# Patient Record
Sex: MALE | Race: WHITE | Employment: FULL TIME | URBAN - METROPOLITAN AREA
[De-identification: names, ages, dates, MRNs, and addresses within clinical notes are randomized per-mention and may not be internally consistent; named-entity substitution may affect disease eponyms.]

---

## 2023-01-09 ENCOUNTER — HOSPITAL ENCOUNTER (OUTPATIENT)
Dept: PREADMISSION TESTING | Age: 58
Discharge: HOME | End: 2023-01-09

## 2023-01-09 VITALS — BODY MASS INDEX: 34.65 KG/M2 | WEIGHT: 242 LBS | HEIGHT: 70 IN

## 2023-01-09 RX ORDER — SODIUM CHLORIDE, SODIUM LACTATE, POTASSIUM CHLORIDE, CALCIUM CHLORIDE 600; 310; 30; 20 MG/100ML; MG/100ML; MG/100ML; MG/100ML
125 INJECTION, SOLUTION INTRAVENOUS CONTINUOUS
Status: CANCELLED | OUTPATIENT
Start: 2023-01-09

## 2023-01-09 RX ORDER — CETIRIZINE HYDROCHLORIDE 10 MG/1
10 TABLET ORAL DAILY
COMMUNITY

## 2023-01-09 RX ORDER — ALLOPURINOL 300 MG/1
300 TABLET ORAL DAILY
COMMUNITY

## 2023-01-09 RX ORDER — FLUTICASONE PROPIONATE 50 MCG
2 SPRAY, SUSPENSION (ML) NASAL DAILY
COMMUNITY

## 2023-01-09 NOTE — PERIOP NOTES
PAT - SURGICAL PRE-ADMISSION INSTRUCTIONS    NAME:  José Mcgowan                                                          TODAY'S DATE:  1/9/2023    SURGERY DATE:  1/20/2023                                  SURGERY ARRIVAL TIME:   tba    Do NOT eat or drink anything, including candy or gum, after MIDNIGHT on 1/19/23 , unless you have specific instructions from your Surgeon or Anesthesia Provider to do so. No smoking 24 hours before surgery. No alcohol 24 hours prior to the day of surgery. No recreational drugs for one week prior to the day of surgery. Leave all valuables, including money/purse, at home. Remove all jewelry, nail polish, makeup (including mascara); no lotions, powders, deodorant, or perfume/cologne/after shave. Glasses/Contact lenses and Dentures may be worn to the hospital.  They will be removed prior to surgery. Call your doctor if symptoms of a cold or illness develop within 24 ours prior to surgery. AN ADULT MUST DRIVE YOU HOME AFTER OUTPATIENT SURGERY. If you are having an OUTPATIENT procedure, please make arrangements for a responsible adult to be with you for 24 hours after your surgery. If you are admitted to the hospital, you will be assigned to a bed after surgery is complete. Normally a family member will not be able to see you until you are in your assigned bed. 12. Visitation Restrictions Explained. Special Instructions: Take these medications the morning of surgery with a sip of water:  Protonix    Patient Prep:    shower with anti-bacterial soap     These surgical instructions were reviewed with patient during the PAT phone call.

## 2023-01-19 ENCOUNTER — ANESTHESIA EVENT (OUTPATIENT)
Dept: SURGERY | Age: 58
End: 2023-01-19
Payer: COMMERCIAL

## 2023-01-20 ENCOUNTER — HOSPITAL ENCOUNTER (OUTPATIENT)
Age: 58
Setting detail: OUTPATIENT SURGERY
Discharge: HOME | End: 2023-01-20
Attending: SURGERY | Admitting: SURGERY
Payer: COMMERCIAL

## 2023-01-20 ENCOUNTER — ANESTHESIA (OUTPATIENT)
Dept: SURGERY | Age: 58
End: 2023-01-20
Payer: COMMERCIAL

## 2023-01-20 VITALS
SYSTOLIC BLOOD PRESSURE: 144 MMHG | DIASTOLIC BLOOD PRESSURE: 76 MMHG | OXYGEN SATURATION: 96 % | WEIGHT: 238.7 LBS | HEART RATE: 88 BPM | BODY MASS INDEX: 34.17 KG/M2 | TEMPERATURE: 97 F | RESPIRATION RATE: 18 BRPM | HEIGHT: 70 IN

## 2023-01-20 DIAGNOSIS — Z87.19 S/P LAPAROSCOPIC HERNIA REPAIR: Primary | ICD-10-CM

## 2023-01-20 DIAGNOSIS — Z98.890 S/P LAPAROSCOPIC HERNIA REPAIR: Primary | ICD-10-CM

## 2023-01-20 PROCEDURE — 77030040361 HC SLV COMPR DVT MDII -B: Performed by: SURGERY

## 2023-01-20 PROCEDURE — 77030008516 HC TBNG INSUF ENDO S&N -B: Performed by: SURGERY

## 2023-01-20 PROCEDURE — 77030035277 HC OBTRTR BLDELSS DISP INTU -B: Performed by: SURGERY

## 2023-01-20 PROCEDURE — 77030020703 HC SEAL CANN DISP INTU -B: Performed by: SURGERY

## 2023-01-20 PROCEDURE — 76060000034 HC ANESTHESIA 1.5 TO 2 HR: Performed by: SURGERY

## 2023-01-20 PROCEDURE — 74011250636 HC RX REV CODE- 250/636: Performed by: SURGERY

## 2023-01-20 PROCEDURE — 77030010507 HC ADH SKN DERMBND J&J -B: Performed by: SURGERY

## 2023-01-20 PROCEDURE — 74011250636 HC RX REV CODE- 250/636: Performed by: NURSE ANESTHETIST, CERTIFIED REGISTERED

## 2023-01-20 PROCEDURE — 74011250637 HC RX REV CODE- 250/637: Performed by: SURGERY

## 2023-01-20 PROCEDURE — 77030008683 HC TU ET CUF COVD -A: Performed by: ANESTHESIOLOGY

## 2023-01-20 PROCEDURE — 77030003578 HC NDL INSUF VERES AMR -B: Performed by: SURGERY

## 2023-01-20 PROCEDURE — 77030008477 HC STYL SATN SLP COVD -A: Performed by: ANESTHESIOLOGY

## 2023-01-20 PROCEDURE — 76010000875 HC OR TIME 1.5 TO 2HR INTENSV - TIER 2: Performed by: SURGERY

## 2023-01-20 PROCEDURE — 77030002933 HC SUT MCRYL J&J -A: Performed by: SURGERY

## 2023-01-20 PROCEDURE — 76210000016 HC OR PH I REC 1 TO 1.5 HR: Performed by: SURGERY

## 2023-01-20 PROCEDURE — 77030020782 HC GWN BAIR PAWS FLX 3M -B: Performed by: SURGERY

## 2023-01-20 PROCEDURE — 2709999900 HC NON-CHARGEABLE SUPPLY: Performed by: SURGERY

## 2023-01-20 PROCEDURE — 74011250637 HC RX REV CODE- 250/637: Performed by: ANESTHESIOLOGY

## 2023-01-20 PROCEDURE — 77030006643: Performed by: ANESTHESIOLOGY

## 2023-01-20 PROCEDURE — 74011000250 HC RX REV CODE- 250: Performed by: SURGERY

## 2023-01-20 PROCEDURE — 74011000250 HC RX REV CODE- 250: Performed by: NURSE ANESTHETIST, CERTIFIED REGISTERED

## 2023-01-20 PROCEDURE — 77030031139 HC SUT VCRL2 J&J -A: Performed by: SURGERY

## 2023-01-20 PROCEDURE — 76210000020 HC REC RM PH II FIRST 0.5 HR: Performed by: SURGERY

## 2023-01-20 PROCEDURE — 77030016151 HC PROTCTR LNS DFOG COVD -B: Performed by: SURGERY

## 2023-01-20 PROCEDURE — 74011250636 HC RX REV CODE- 250/636: Performed by: ANESTHESIOLOGY

## 2023-01-20 PROCEDURE — 74011000250 HC RX REV CODE- 250: Performed by: ANESTHESIOLOGY

## 2023-01-20 PROCEDURE — 77030028402 HC SYS LAPSC TISS RETRV AMR -B: Performed by: SURGERY

## 2023-01-20 PROCEDURE — C1781 MESH (IMPLANTABLE): HCPCS | Performed by: SURGERY

## 2023-01-20 PROCEDURE — 77030022704 HC SUT VLOC COVD -B: Performed by: SURGERY

## 2023-01-20 DEVICE — MESH HERN RND 12 CM COMP PARIETENE DS: Type: IMPLANTABLE DEVICE | Site: ABDOMEN | Status: FUNCTIONAL

## 2023-01-20 RX ORDER — DEXAMETHASONE SODIUM PHOSPHATE 4 MG/ML
INJECTION, SOLUTION INTRA-ARTICULAR; INTRALESIONAL; INTRAMUSCULAR; INTRAVENOUS; SOFT TISSUE AS NEEDED
Status: DISCONTINUED | OUTPATIENT
Start: 2023-01-20 | End: 2023-01-20 | Stop reason: HOSPADM

## 2023-01-20 RX ORDER — DEXMEDETOMIDINE HYDROCHLORIDE 100 UG/ML
INJECTION, SOLUTION INTRAVENOUS AS NEEDED
Status: DISCONTINUED | OUTPATIENT
Start: 2023-01-20 | End: 2023-01-20 | Stop reason: HOSPADM

## 2023-01-20 RX ORDER — CEFAZOLIN SODIUM/WATER 2 G/20 ML
2 SYRINGE (ML) INTRAVENOUS ONCE
Status: COMPLETED | OUTPATIENT
Start: 2023-01-20 | End: 2023-01-20

## 2023-01-20 RX ORDER — HYDROMORPHONE HYDROCHLORIDE 1 MG/ML
0.2 INJECTION, SOLUTION INTRAMUSCULAR; INTRAVENOUS; SUBCUTANEOUS AS NEEDED
Status: DISCONTINUED | OUTPATIENT
Start: 2023-01-20 | End: 2023-01-20 | Stop reason: HOSPADM

## 2023-01-20 RX ORDER — PROPOFOL 10 MG/ML
INJECTION, EMULSION INTRAVENOUS AS NEEDED
Status: DISCONTINUED | OUTPATIENT
Start: 2023-01-20 | End: 2023-01-20 | Stop reason: HOSPADM

## 2023-01-20 RX ORDER — ACETAMINOPHEN 500 MG
TABLET ORAL
COMMUNITY

## 2023-01-20 RX ORDER — SODIUM CHLORIDE 0.9 % (FLUSH) 0.9 %
5-40 SYRINGE (ML) INJECTION AS NEEDED
Status: DISCONTINUED | OUTPATIENT
Start: 2023-01-20 | End: 2023-01-20 | Stop reason: HOSPADM

## 2023-01-20 RX ORDER — MIDAZOLAM HYDROCHLORIDE 1 MG/ML
INJECTION, SOLUTION INTRAMUSCULAR; INTRAVENOUS AS NEEDED
Status: DISCONTINUED | OUTPATIENT
Start: 2023-01-20 | End: 2023-01-20 | Stop reason: HOSPADM

## 2023-01-20 RX ORDER — ONDANSETRON 2 MG/ML
INJECTION INTRAMUSCULAR; INTRAVENOUS AS NEEDED
Status: DISCONTINUED | OUTPATIENT
Start: 2023-01-20 | End: 2023-01-20 | Stop reason: HOSPADM

## 2023-01-20 RX ORDER — HYDROMORPHONE HYDROCHLORIDE 2 MG/ML
INJECTION, SOLUTION INTRAMUSCULAR; INTRAVENOUS; SUBCUTANEOUS AS NEEDED
Status: DISCONTINUED | OUTPATIENT
Start: 2023-01-20 | End: 2023-01-20 | Stop reason: HOSPADM

## 2023-01-20 RX ORDER — FENTANYL CITRATE 50 UG/ML
50 INJECTION, SOLUTION INTRAMUSCULAR; INTRAVENOUS
Status: DISCONTINUED | OUTPATIENT
Start: 2023-01-20 | End: 2023-01-20 | Stop reason: HOSPADM

## 2023-01-20 RX ORDER — SODIUM CHLORIDE 0.9 % (FLUSH) 0.9 %
5-40 SYRINGE (ML) INJECTION EVERY 8 HOURS
Status: DISCONTINUED | OUTPATIENT
Start: 2023-01-20 | End: 2023-01-20 | Stop reason: HOSPADM

## 2023-01-20 RX ORDER — DEXTROSE MONOHYDRATE 100 MG/ML
0-250 INJECTION, SOLUTION INTRAVENOUS AS NEEDED
Status: DISCONTINUED | OUTPATIENT
Start: 2023-01-20 | End: 2023-01-20 | Stop reason: HOSPADM

## 2023-01-20 RX ORDER — OXYCODONE HYDROCHLORIDE 5 MG/1
5 TABLET ORAL
Qty: 15 TABLET | Refills: 0 | Status: SHIPPED | OUTPATIENT
Start: 2023-01-20 | End: 2023-01-23

## 2023-01-20 RX ORDER — SODIUM CHLORIDE, SODIUM LACTATE, POTASSIUM CHLORIDE, CALCIUM CHLORIDE 600; 310; 30; 20 MG/100ML; MG/100ML; MG/100ML; MG/100ML
125 INJECTION, SOLUTION INTRAVENOUS CONTINUOUS
Status: DISCONTINUED | OUTPATIENT
Start: 2023-01-20 | End: 2023-01-20 | Stop reason: HOSPADM

## 2023-01-20 RX ORDER — ROCURONIUM BROMIDE 10 MG/ML
INJECTION, SOLUTION INTRAVENOUS AS NEEDED
Status: DISCONTINUED | OUTPATIENT
Start: 2023-01-20 | End: 2023-01-20 | Stop reason: HOSPADM

## 2023-01-20 RX ORDER — EPHEDRINE SULFATE/0.9% NACL/PF 50 MG/5 ML
SYRINGE (ML) INTRAVENOUS AS NEEDED
Status: DISCONTINUED | OUTPATIENT
Start: 2023-01-20 | End: 2023-01-20 | Stop reason: HOSPADM

## 2023-01-20 RX ORDER — FENTANYL CITRATE 50 UG/ML
INJECTION, SOLUTION INTRAMUSCULAR; INTRAVENOUS AS NEEDED
Status: DISCONTINUED | OUTPATIENT
Start: 2023-01-20 | End: 2023-01-20 | Stop reason: HOSPADM

## 2023-01-20 RX ORDER — LIDOCAINE HYDROCHLORIDE 20 MG/ML
INJECTION, SOLUTION EPIDURAL; INFILTRATION; INTRACAUDAL; PERINEURAL AS NEEDED
Status: DISCONTINUED | OUTPATIENT
Start: 2023-01-20 | End: 2023-01-20 | Stop reason: HOSPADM

## 2023-01-20 RX ORDER — ONDANSETRON 2 MG/ML
4 INJECTION INTRAMUSCULAR; INTRAVENOUS ONCE
Status: DISCONTINUED | OUTPATIENT
Start: 2023-01-20 | End: 2023-01-20 | Stop reason: HOSPADM

## 2023-01-20 RX ORDER — KETOROLAC TROMETHAMINE 30 MG/ML
30 INJECTION, SOLUTION INTRAMUSCULAR; INTRAVENOUS
Status: COMPLETED | OUTPATIENT
Start: 2023-01-20 | End: 2023-01-20

## 2023-01-20 RX ORDER — BUPIVACAINE HYDROCHLORIDE AND EPINEPHRINE 2.5; 5 MG/ML; UG/ML
INJECTION, SOLUTION EPIDURAL; INFILTRATION; INTRACAUDAL; PERINEURAL AS NEEDED
Status: DISCONTINUED | OUTPATIENT
Start: 2023-01-20 | End: 2023-01-20 | Stop reason: HOSPADM

## 2023-01-20 RX ORDER — IBUPROFEN 200 MG
4 TABLET ORAL AS NEEDED
Status: DISCONTINUED | OUTPATIENT
Start: 2023-01-20 | End: 2023-01-20 | Stop reason: HOSPADM

## 2023-01-20 RX ORDER — PHENYLEPHRINE HCL IN 0.9% NACL 1 MG/10 ML
SYRINGE (ML) INTRAVENOUS AS NEEDED
Status: DISCONTINUED | OUTPATIENT
Start: 2023-01-20 | End: 2023-01-20 | Stop reason: HOSPADM

## 2023-01-20 RX ORDER — ONDANSETRON 8 MG/1
8 TABLET, ORALLY DISINTEGRATING ORAL
Qty: 9 TABLET | Refills: 0 | Status: SHIPPED | OUTPATIENT
Start: 2023-01-20

## 2023-01-20 RX ORDER — ACETAMINOPHEN 500 MG
1000 TABLET ORAL ONCE
Status: COMPLETED | OUTPATIENT
Start: 2023-01-20 | End: 2023-01-20

## 2023-01-20 RX ORDER — SODIUM CHLORIDE, SODIUM LACTATE, POTASSIUM CHLORIDE, CALCIUM CHLORIDE 600; 310; 30; 20 MG/100ML; MG/100ML; MG/100ML; MG/100ML
50 INJECTION, SOLUTION INTRAVENOUS CONTINUOUS
Status: DISCONTINUED | OUTPATIENT
Start: 2023-01-20 | End: 2023-01-20 | Stop reason: HOSPADM

## 2023-01-20 RX ORDER — NALOXONE HYDROCHLORIDE 0.4 MG/ML
INJECTION, SOLUTION INTRAMUSCULAR; INTRAVENOUS; SUBCUTANEOUS AS NEEDED
Status: DISCONTINUED | OUTPATIENT
Start: 2023-01-20 | End: 2023-01-20 | Stop reason: HOSPADM

## 2023-01-20 RX ORDER — GLYCOPYRROLATE 0.2 MG/ML
INJECTION INTRAMUSCULAR; INTRAVENOUS AS NEEDED
Status: DISCONTINUED | OUTPATIENT
Start: 2023-01-20 | End: 2023-01-20 | Stop reason: HOSPADM

## 2023-01-20 RX ADMIN — HYDROMORPHONE HYDROCHLORIDE 0.2 MG: 2 INJECTION, SOLUTION INTRAMUSCULAR; INTRAVENOUS; SUBCUTANEOUS at 13:21

## 2023-01-20 RX ADMIN — ONDANSETRON HYDROCHLORIDE 4 MG: 2 INJECTION INTRAMUSCULAR; INTRAVENOUS at 14:13

## 2023-01-20 RX ADMIN — KETOROLAC TROMETHAMINE 30 MG: 30 INJECTION, SOLUTION INTRAMUSCULAR at 15:08

## 2023-01-20 RX ADMIN — DEXMEDETOMIDINE HYDROCHLORIDE 6 MCG: 100 INJECTION, SOLUTION INTRAVENOUS at 14:04

## 2023-01-20 RX ADMIN — ROCURONIUM BROMIDE 80 MG: 10 INJECTION, SOLUTION INTRAVENOUS at 12:51

## 2023-01-20 RX ADMIN — PROPOFOL 30 MG: 10 INJECTION, EMULSION INTRAVENOUS at 13:10

## 2023-01-20 RX ADMIN — PROPOFOL 200 MG: 10 INJECTION, EMULSION INTRAVENOUS at 12:50

## 2023-01-20 RX ADMIN — Medication 100 MCG: at 13:03

## 2023-01-20 RX ADMIN — HYDROMORPHONE HYDROCHLORIDE 0.5 MG: 2 INJECTION, SOLUTION INTRAMUSCULAR; INTRAVENOUS; SUBCUTANEOUS at 12:57

## 2023-01-20 RX ADMIN — FENTANYL CITRATE 50 MCG: 50 INJECTION, SOLUTION INTRAMUSCULAR; INTRAVENOUS at 12:47

## 2023-01-20 RX ADMIN — LIDOCAINE HYDROCHLORIDE 100 MG: 20 INJECTION, SOLUTION INTRAVENOUS at 12:50

## 2023-01-20 RX ADMIN — FENTANYL CITRATE 50 MCG: 50 INJECTION, SOLUTION INTRAMUSCULAR; INTRAVENOUS at 13:08

## 2023-01-20 RX ADMIN — DEXAMETHASONE SODIUM PHOSPHATE 8 MG: 4 INJECTION, SOLUTION INTRAMUSCULAR; INTRAVENOUS at 13:01

## 2023-01-20 RX ADMIN — SODIUM CHLORIDE, SODIUM LACTATE, POTASSIUM CHLORIDE, AND CALCIUM CHLORIDE 125 ML/HR: 600; 310; 30; 20 INJECTION, SOLUTION INTRAVENOUS at 08:49

## 2023-01-20 RX ADMIN — SUGAMMADEX 30 MG: 100 INJECTION, SOLUTION INTRAVENOUS at 14:27

## 2023-01-20 RX ADMIN — SODIUM CHLORIDE, SODIUM LACTATE, POTASSIUM CHLORIDE, AND CALCIUM CHLORIDE 50 ML/HR: 600; 310; 30; 20 INJECTION, SOLUTION INTRAVENOUS at 08:49

## 2023-01-20 RX ADMIN — Medication 1 AMPULE: at 08:45

## 2023-01-20 RX ADMIN — MIDAZOLAM 2 MG: 1 INJECTION INTRAMUSCULAR; INTRAVENOUS at 12:39

## 2023-01-20 RX ADMIN — ACETAMINOPHEN 1000 MG: 500 TABLET ORAL at 08:45

## 2023-01-20 RX ADMIN — Medication 5 MG: at 13:03

## 2023-01-20 RX ADMIN — Medication 2 G: at 13:01

## 2023-01-20 RX ADMIN — HYDROMORPHONE HYDROCHLORIDE 0.3 MG: 2 INJECTION, SOLUTION INTRAMUSCULAR; INTRAVENOUS; SUBCUTANEOUS at 14:19

## 2023-01-20 RX ADMIN — GLYCOPYRROLATE 0.2 MG: 0.2 INJECTION INTRAMUSCULAR; INTRAVENOUS at 13:03

## 2023-01-20 RX ADMIN — SUGAMMADEX 220 MG: 100 INJECTION, SOLUTION INTRAVENOUS at 14:17

## 2023-01-20 RX ADMIN — ROCURONIUM BROMIDE 10 MG: 10 INJECTION, SOLUTION INTRAVENOUS at 13:46

## 2023-01-20 RX ADMIN — NALOXONE HYDROCHLORIDE 0.04 MG: 0.4 INJECTION, SOLUTION INTRAMUSCULAR; INTRAVENOUS; SUBCUTANEOUS at 14:32

## 2023-01-20 NOTE — INTERVAL H&P NOTE
Update History & Physical    The Patient's History and Physical of January 20, 2023 was reviewed with the patient and I examined the patient. There was no change. The surgical site was confirmed by the patient and me. Plan:  The risk, benefits, expected outcome, and alternative to the recommended procedure have been discussed with the patient. Patient understands and wants to proceed with the procedure.     Electronically signed by Shay Trevino MD on 1/20/2023 at 10:40 AM

## 2023-01-20 NOTE — DISCHARGE INSTRUCTIONS
DISCHARGE SUMMARY from Nurse    PATIENT INSTRUCTIONS:    After general anesthesia or intravenous sedation, for 24 hours or while taking prescription Narcotics:  Limit your activities  Do not drive and operate hazardous machinery  Do not make important personal or business decisions  Do  not drink alcoholic beverages  If you have not urinated within 8 hours after discharge, please contact your surgeon on call. Report the following to your surgeon:  Excessive pain, swelling, redness or odor of or around the surgical area  Temperature over 100.5  Nausea and vomiting lasting longer than 4 hours or if unable to take medications  Any signs of decreased circulation or nerve impairment to extremity: change in color, persistent  numbness, tingling, coldness or increase pain  Any questions    What to do at Home:  Recommended activity: Activity as tolerated and no driving until advised by doctor. *  Please give a list of your current medications to your Primary Care Provider. *  Please update this list whenever your medications are discontinued, doses are      changed, or new medications (including over-the-counter products) are added. *  Please carry medication information at all times in case of emergency situations. These are general instructions for a healthy lifestyle:    No smoking/ No tobacco products/ Avoid exposure to second hand smoke  Surgeon General's Warning:  Quitting smoking now greatly reduces serious risk to your health. Obesity, smoking, and sedentary lifestyle greatly increases your risk for illness    A healthy diet, regular physical exercise & weight monitoring are important for maintaining a healthy lifestyle    You may be retaining fluid if you have a history of heart failure or if you experience any of the following symptoms:  Weight gain of 3 pounds or more overnight or 5 pounds in a week, increased swelling in our hands or feet or shortness of breath while lying flat in bed. Please call your doctor as soon as you notice any of these symptoms; do not wait until your next office visit. Patient armband removed and shredded. The discharge information has been reviewed with the patient and caregiver. The patient and caregiver verbalized understanding. Discharge medications reviewed with the patient and caregiver and appropriate educational materials and side effects teaching were provided.   ___________________________________________________________________________________________________________________________________

## 2023-01-20 NOTE — PERIOP NOTES
TRANSFER - IN REPORT:    Verbal report received from [address] (name) latoya Muse  being received from PACU (unit) for routine progression of care      Report consisted of patients Situation, Background, Assessment and   Recommendations(SBAR). Information from the following report(s) OR Summary, Procedure Summary, Intake/Output, and MAR was reviewed with the receiving nurse. Opportunity for questions and clarification was provided. Assessment completed upon patients arrival to unit and care assumed.

## 2023-01-20 NOTE — BRIEF OP NOTE
Brief Postoperative Note    Patient: John Muse  YOB: 1965  MRN: [id number]    Date of Procedure: 1/20/2023     Pre-Op Diagnosis: INCARCERATED VENTRAL INCISIONAL HERNIA, H/O PREVIOUS SUPRAUMBILICAL HERNIA REPAIR WITH IPOM MESH    Post-Op Diagnosis: Same as preoperative diagnosis. Procedure(s):  LAPAROSCOPIC ROBOTIC ASSISTED VENTRAL INCISIONAL HERNIA REPAIR WITH MESH, REMOVAL OF OLD INTRA-ABDOMINAL MESH    Surgeon(s):  Charmaine Pina MD    Surgical Assistant: Surg Asst-1: Mickie Roberts    Anesthesia: General     Estimated Blood Loss (mL): Minimal    Complications: None    Specimens: * No specimens in log *     Implants:   Implant Name Type Inv.  Item Serial No.  Lot No. LRB No. Used Action   MESH CYRIL RND 12 CM COMP PARIETENE DS - [id number]  MESH CYRIL RND 12 CM COMP PARIETENE DS  MEDTRONIC COVIDIEN US SURGICAL_WD [id number] N/A 1 Implanted       Drains: * No LDAs found *    Findings: SEE OP NOTE    Electronically Signed by Jas Adames MD on 1/20/2023 at 2:47 PM    Op note dictated #[id number]  RP

## 2023-01-20 NOTE — PERIOP NOTES
TRANSFER - OUT REPORT:    Verbal report given to [place] (name) on Melissa Nunes  being transferred to Phase II (unit) for routine progression of care       Report consisted of patients Situation, Background, Assessment and   Recommendations(SBAR). Information from the following report(s) SBAR, OR Summary, Procedure Summary, Intake/Output, and MAR was reviewed with the receiving nurse. Lines:   Peripheral IV 01/20/23 Left;Posterior Hand (Active)   Site Assessment Clean, dry, & intact 01/20/23 1440   Phlebitis Assessment 0 01/20/23 1440   Infiltration Assessment 0 01/20/23 1440   Dressing Status Clean, dry, & intact 01/20/23 1440   Dressing Type Transparent;Tape 01/20/23 1440   Hub Color/Line Status Infusing 01/20/23 1440       Peripheral IV 01/20/23 Posterior;Right Hand (Active)   Site Assessment Clean, dry, & intact 01/20/23 1440   Phlebitis Assessment 0 01/20/23 1440   Infiltration Assessment 0 01/20/23 1440   Dressing Status Clean, dry, & intact 01/20/23 1440   Dressing Type Tape 01/20/23 1440   Hub Color/Line Status Infusing 01/20/23 1440        Opportunity for questions and clarification was provided.       Patient transported with:   Registered Nurse

## 2023-01-20 NOTE — PERIOP NOTES
Reviewed PTA medication list with patient/caregiver and patient/caregiver denies any additional medications. Patient admits to having a responsible adult care for them at home for at least 24 hours after surgery. Patient encouraged to use gown warming system and informed that using said warming gown to regulate body temperature prior to a procedure has been shown to help reduce the risks of blood clots and infection. Patient's pharmacy of choice verified and documented in PTA medication section.     Dual skin assessment & fall risk band verification completed with Tyra VELASQUEZ RN.

## 2023-01-20 NOTE — ANESTHESIA POSTPROCEDURE EVALUATION
Procedure(s):  LAPAROSCOPIC ROBOTIC ASSISTED VENTRAL INCISIONAL HERNIA REPAIR WITH MESH, REMOVAL OF OLD INTRA  ABDOMINAL MESH. general    Anesthesia Post Evaluation      Multimodal analgesia: multimodal analgesia used between 6 hours prior to anesthesia start to PACU discharge  Patient location during evaluation: PACU  Patient participation: complete - patient participated  Level of consciousness: awake and alert  Pain score: 4  Pain management: adequate  Airway patency: patent  Anesthetic complications: no  Cardiovascular status: blood pressure returned to baseline and hemodynamically stable  Respiratory status: room air, nonlabored ventilation and spontaneous ventilation  Hydration status: acceptable  Comments: Patient doing well in phase 1 PACU. Spontaneous ventilation with patent airway. No distress. A&Ox4. Answering questions appropriately. Post anesthesia nausea and vomiting:  none  Final Post Anesthesia Temperature Assessment:  Normothermia (36.0-37.5 degrees C)      INITIAL Post-op Vital signs:   Vitals Value Taken Time   /70 01/20/23 1544   Temp 36.6 °C (97.8 °F) 01/20/23 1443   Pulse 72 01/20/23 1558   Resp 9 01/20/23 1558   SpO2 99 % 01/20/23 1558   Vitals shown include unvalidated device data.

## 2023-01-20 NOTE — PERIOP NOTES
TRANSFER - IN REPORT:    Verbal report received from [address] [place] [address] OR nurse(name) on John Muse  being received from OR (unit) for routine progression of care      Report consisted of patients Situation, Background, Assessment and   Recommendations(SBAR). Information from the following report(s) OR Summary, Procedure Summary, Intake/Output, and MAR was reviewed with the receiving nurse. Opportunity for questions and clarification was provided. Assessment completed upon patients arrival to unit and care assumed.

## 2023-01-20 NOTE — ANESTHESIA PREPROCEDURE EVALUATION
Relevant Problems   No relevant active problems       Anesthetic History   No history of anesthetic complications            Review of Systems / Medical History  Patient summary reviewed, nursing notes reviewed and pertinent labs reviewed    Pulmonary  Within defined limits            Pertinent negatives: No recent URI, sleep apnea and smoker     Neuro/Psych           Pertinent negatives: No seizures, CVA and psychiatric history   Cardiovascular                Pertinent negatives: No pacemaker, hypertension, past MI and CHF  Exercise tolerance: >4 METS     GI/Hepatic/Renal     GERD (took PPI this AM): well controlled        Pertinent negatives: No liver disease and renal disease   Endo/Other        Obesity and arthritis (gout)  Pertinent negatives: No diabetes   Other Findings              Physical Exam    Airway  Mallampati: I  TM Distance: > 6 cm  Neck ROM: normal range of motion   Mouth opening: Normal     Cardiovascular    Rhythm: regular  Rate: normal         Dental  No notable dental hx       Pulmonary  Breath sounds clear to auscultation               Abdominal  GI exam deferred       Other Findings            Anesthetic Plan    ASA: 2  Anesthesia type: general    Monitoring Plan: Continuous noninvasive hemodynamic monitoring      Induction: Intravenous  Anesthetic plan and risks discussed with: Patient      All questions answered. Risks of GA d/w patient. Verbalized understanding and agrees to proceed.

## 2023-01-21 NOTE — OP NOTES
[facility]  OPERATIVE REPORT    Name:  Meli Lugo  MR#:   [id number]  :  1965  ACCOUNT #:  [de-identified]  DATE OF SERVICE:  2023    GENERAL SURGERY AMBULATORY OPERATIVE NOTE    PREOPERATIVE DIAGNOSES:  1. Incarcerated ventral incisional hernia. 2.  History of previous supraumbilical hernia repair with mesh. POSTOPERATIVE DIAGNOSES:  1. Incarcerated ventral incisional hernia. 2.  History of previous supraumbilical hernia repair with mesh. PROCEDURE PERFORMED:  1. Laparoscopic removal of old intraabdominal (IPOM) mesh. 2.  Laparoscopic robotic-assisted repair of incarcerated ventral incisional hernia with mesh. SURGEON:  Delta Granados MD.    ASSISTANT:  None. ANESTHESIA:  General.    COMPLICATIONS:  None. SPECIMENS REMOVED:  None. MESH/IMPLANTS:  Medtronic Covidien coated mesh placed intra-abdominal (IPOM), 10 cm vertical x 7 cm transverse oval.  Additional implants none. ESTIMATED BLOOD LOSS:  Minimal.    INDICATIONS:  The patient is a 64-year-old male referred to me for a slowly enlarging hernia at his old laparoscopic incision below his previous open repair of supraumbilical hernia. He wished to have it surgically repaired. In the office, he had, what appeared to be, either omentum or preperitoneal fat, which was partially reducible. In the preoperative holding area, that same intraabdominal or preperitoneal fat was found to be truly incarcerated. No evidence of any bowel obstruction had been present preoperatively or at the time of this surgery. He had a previous open repair of the supraumbilical hernia, repaired with mesh. He did not know whether the mesh was placed intraabdominal or in the preperitoneal position. Because of the increasingly symptomatic nature of the hernia, he did wish to proceed with surgery. I discussed with him the nature of surgery, alternatives, benefits, and risks. He gave consent to proceed.     PROCEDURE:  He was taken to the OR on 01/20/2023, met and identified in the preoperative holding area. Surgical site was marked. He was then brought to the operating room. Preoperative intravenous antibiotics were given per protocol. He was positioned on the table. All pressure points were appropriately padded. Sequential compression devices were applied. General anesthesia was administered with monitors and oxygen in place. The area was shaved with electric clippers and prepped and draped in usual sterile fashion. After surgical pause, the surgical sites were first marked and then locally anesthetized with 0.25% Marcaine with epinephrine. We started with a left upper quadrant subcostal 8 mm incision with 15-blade through skin, and this was then bluntly dissected down to the fascia using Nikky clamp. Then, while elevating the adjacent abdominal wall, Veress needle was inserted through the abdominal wall, confirmed to be in the correct intraabdominal position using saline aspiration followed by injection. Once this position was confirmed, Veress needle was used to establish pneumoperitoneum to a pressure of 15 mmHg. Once this pressure was achieved, the Veress needle was removed. The 8-mm bladeless robotic trocar was inserted while elevating the adjacent abdominal wall using my hand. A 30-degree laparoscope was inserted. There was no evidence of any Veress needle or trocar-induced trauma to the underlying abdominal viscera. This area was again visualized at the end procedure from the right-sided trocar. There were no adhesions in this area, again no evidence of trauma to the underlying abdominal viscera was present.   Because of the previous supraumbilical mesh repair, which was, in fact, intraperitoneal or intra-abdominal placement of mesh as I found upon entry, I did feel that a \"top-down\" approach was better suited to the anatomy at hand; therefore, I made an 8-mm transverse subxiphoid incision, 8-mm right abdominal subcostal incision, and through these, 8-mm bladeless trocars were inserted, both under laparoscopic vision. Lastly, I placed a left flank 10-mm incision and through this, I inserted a bladeless AirSeal trocar under laparoscopic vision. Cameras were withdrawn. Robot was draped sterilely, brought into field, docked. Camera was reinserted. The patient was positioned appropriately before the robot was docked. After camera insertion, the instruments were brought under laparoscopic vision. I took my position at the console and began the procedure. The first part of the procedure was laparoscopic lysis of adhesions to free up omental adhesions mostly to the intraperitoneal placed mesh. This was done with gentle blunt and sharp and where appropriate, Bovie dissection. This allowed it to be freed. The hernia defect itself measured approximately 3.5 cm transverse x 1.5 cm vertical.  It was right at the inferior aspect of the previously placed mesh. My concern closing this was that the previously placed mesh may interfere not only with the closure, but also in subsequent mesh placement and subsequent incorporation of that mesh. Therefore, I did feel that the removal of that mesh was the best approach. This was done using sharp, blunt, and where appropriate, Bovie cauterization. The mesh was removed. As a result, it was, otherwise, well incorporated without any issues. I did not sent it for culture or pathology. We then proceeded with repair of the incisional hernia. As mentioned, it was found to have incarcerated omentum, not really any significant amount of preperitoneal fat. The incarcerated omentum was able to be reduced with gentle manual traction. I then did remove the hernia sac as completely as possible using gentle blunt and Bovie dissection. The defect, as mentioned, measured out at 3.5 cm transverse x 1.5 cm vertical.    It was closed with a running 0 V-Loc.   Then, we reinforced this primary repair as well as covered the previous intraabdominal mesh position with a new piece of Medtronic Covidien coated mesh. A 12 x 12 [place] was cut down to a 10 cm vertical x 7 cm transverse. This widely covered the defect in all directions. I used the tail end of the remaining 0 V-Loc to secure this mesh along the midline and then with the pressure now set at 6 mmHg, I attached the periphery of the mesh circumferentially to the peritoneum. Suture, needle, and sponge count were correct at the end of the procedure. The patient tolerated the entire procedure without incident. The robot was undocked. Camera and instruments were removed, all under laparoscopic vision. Trocar was removed. Hemostasis was satisfactory at all sites. All skin incisions were irrigated. Skin was closed with 4-0 Monocryl subcuticular suture. Incisions were then cleaned, dried, and dressed with Dermabond. The patient tolerated the entire procedure without incident. He was extubated in the OR and taken to recovery room postoperatively in a stable condition.       Will Scott MD      [id number]  D:  01/20/2023 14:56  T:  01/21/2023 1:45  JOB #:  [id number]

## (undated) DEVICE — ARM DRAPE

## (undated) DEVICE — OBTRTR BLDELSS OPT 8MM DISP -- DA VINICI XI - SNGL USE

## (undated) DEVICE — GLOVE ORANGE PI 7 1/2   MSG9075

## (undated) DEVICE — DRAPE C ARM UNIV W41XL74IN CLR PLAS XR VELC CLSR POLY STRP

## (undated) DEVICE — GLOVE SURG SZ 8 L12IN THK75MIL DK GRN LTX FREE

## (undated) DEVICE — COVER MPLR TIP CRV SCIS ACC DA VINCI

## (undated) DEVICE — GARMENT,MEDLINE,DVT,INT,CALF,MED, GEN2: Brand: MEDLINE

## (undated) DEVICE — SOLUTION ANTIFOG VIS SYS CLEARIFY LAPSCP

## (undated) DEVICE — DERMABOND SKIN ADH 0.7ML --

## (undated) DEVICE — APPLICATOR MEDICATED 26 CC SOLUTION HI LT ORNG CHLORAPREP

## (undated) DEVICE — LAP CHOLE: Brand: MEDLINE INDUSTRIES, INC.

## (undated) DEVICE — SUTURE DEV SZ 2-0 WND CLSR ABSRB GS-22 VLOC COVIDIEN VLOCM2145

## (undated) DEVICE — PAD PT POS 36 IN SURGYPAD DISP

## (undated) DEVICE — SEAL UNIV 5-8MM DISP BX/10 -- DA VINCI XI - SNGL USE

## (undated) DEVICE — INSUFFLATION NEEDLE TO ESTABLISH PNEUMOPERITONEUM.: Brand: INSUFFLATION NEEDLE

## (undated) DEVICE — SUTURE ABSORBABLE MONOFILAMENT 0 GS22 9 IN GRN V-LOC 180 VLOCL2246

## (undated) DEVICE — SUTURE VCRL + SZ 3-0 L27IN ABSRB UD L26MM SH 1/2 CIR VCP416H

## (undated) DEVICE — SOL IRR NACL 0.9% 500ML POUR --

## (undated) DEVICE — SUTURE MCRYL SZ 4-0 L18IN ABSRB UD L19MM PS-2 3/8 CIR PRIM Y496G

## (undated) DEVICE — TUBING INSUF L10FT HI FLO 400 INSUFFLATOR SYS DISP FOR LAP

## (undated) DEVICE — COLUMN DRAPE

## (undated) DEVICE — TISSUE RETRIEVAL SYSTEM: Brand: INZII RETRIEVAL SYSTEM

## (undated) DEVICE — SUTURE ABSRB L6IN L37MM 0 GS-21 GRN 1/2 CIR TAPR PNT NDL VLOCL0306